# Patient Record
Sex: MALE | Race: BLACK OR AFRICAN AMERICAN | Employment: OTHER | URBAN - METROPOLITAN AREA
[De-identification: names, ages, dates, MRNs, and addresses within clinical notes are randomized per-mention and may not be internally consistent; named-entity substitution may affect disease eponyms.]

---

## 2017-03-31 ENCOUNTER — HOSPITAL ENCOUNTER (EMERGENCY)
Facility: HOSPITAL | Age: 42
Discharge: HOME | End: 2017-03-31
Payer: MEDICAID

## 2017-03-31 VITALS
SYSTOLIC BLOOD PRESSURE: 137 MMHG | DIASTOLIC BLOOD PRESSURE: 89 MMHG | HEART RATE: 68 BPM | BODY MASS INDEX: 31.83 KG/M2 | RESPIRATION RATE: 16 BRPM | WEIGHT: 210 LBS | HEIGHT: 68 IN | OXYGEN SATURATION: 100 % | TEMPERATURE: 98 F

## 2017-04-01 NOTE — ED INITIAL ASSESSMENT (HPI)
PT states he has yellow drainage from his belly button and redness for 4 days. PT states he noticed this after cleaning his belly button with a q tip.

## 2017-05-25 ENCOUNTER — HOSPITAL ENCOUNTER (EMERGENCY)
Facility: HOSPITAL | Age: 42
Discharge: HOME | End: 2017-05-25
Attending: EMERGENCY MEDICINE
Payer: MEDICAID

## 2017-05-25 VITALS
WEIGHT: 209 LBS | OXYGEN SATURATION: 97 % | DIASTOLIC BLOOD PRESSURE: 96 MMHG | HEIGHT: 67 IN | BODY MASS INDEX: 32.8 KG/M2 | RESPIRATION RATE: 16 BRPM | SYSTOLIC BLOOD PRESSURE: 139 MMHG | HEART RATE: 92 BPM | TEMPERATURE: 98 F

## 2017-05-25 DIAGNOSIS — L23.4 ALLERGIC CONTACT DERMATITIS DUE TO DYES: Primary | ICD-10-CM

## 2017-05-25 PROCEDURE — 99283 EMERGENCY DEPT VISIT LOW MDM: CPT

## 2017-05-25 RX ORDER — METHYLPREDNISOLONE 4 MG/1
TABLET ORAL
Qty: 1 PACKAGE | Refills: 0 | Status: SHIPPED | OUTPATIENT
Start: 2017-05-25 | End: 2017-05-30

## 2017-05-25 NOTE — ED INITIAL ASSESSMENT (HPI)
Pt c/o blisters, swelling and itching to face after applying hair dye, pt denies PANCHO or airway compromise.

## 2017-05-25 NOTE — ED PROVIDER NOTES
Patient Seen in: [facility] Emergency Department    History   Patient presents with:  Rash Skin Problem (integumentary)    Stated Complaint: rash from hair dye     HPI  Patient is a 38-year-old male who states one time previously he used hair dye wh 170.2 cm (5' 7\")  Wt 94.802 kg  BMI 32.73 kg/m2  SpO2 97%        Physical Exam  GENERAL: Patient resting comfortably on the cart in no acute distress. HEENT: Extraocular muscles intact, pupils equal round reactive to light and accommodation.   Mouth lucas

## 2017-10-06 ENCOUNTER — HOSPITAL ENCOUNTER (EMERGENCY)
Age: 42
Discharge: HOME | End: 2017-10-06
Attending: EMERGENCY MEDICINE
Payer: MEDICAID

## 2017-10-06 VITALS
OXYGEN SATURATION: 99 % | SYSTOLIC BLOOD PRESSURE: 145 MMHG | DIASTOLIC BLOOD PRESSURE: 88 MMHG | TEMPERATURE: 99 F | HEART RATE: 69 BPM | RESPIRATION RATE: 18 BRPM | HEIGHT: 70 IN | WEIGHT: 189 LBS | BODY MASS INDEX: 27.06 KG/M2

## 2017-10-06 DIAGNOSIS — T78.40XA ALLERGIC REACTION, INITIAL ENCOUNTER: Primary | ICD-10-CM

## 2017-10-06 PROCEDURE — 99283 EMERGENCY DEPT VISIT LOW MDM: CPT

## 2017-10-06 PROCEDURE — 96372 THER/PROPH/DIAG INJ SC/IM: CPT

## 2017-10-06 RX ORDER — METHYLPREDNISOLONE 4 MG/1
TABLET ORAL
Qty: 1 PACKAGE | Refills: 0 | Status: SHIPPED | OUTPATIENT
Start: 2017-10-06 | End: 2017-10-11

## 2017-10-06 RX ORDER — DIPHENHYDRAMINE HCL 50 MG
50 CAPSULE ORAL ONCE
Status: COMPLETED | OUTPATIENT
Start: 2017-10-06 | End: 2017-10-06

## 2017-10-06 RX ORDER — FAMOTIDINE 20 MG/1
20 TABLET ORAL ONCE
Status: COMPLETED | OUTPATIENT
Start: 2017-10-06 | End: 2017-10-06

## 2017-10-06 RX ORDER — METHYLPREDNISOLONE SODIUM SUCCINATE 125 MG/2ML
125 INJECTION, POWDER, LYOPHILIZED, FOR SOLUTION INTRAMUSCULAR; INTRAVENOUS ONCE
Status: COMPLETED | OUTPATIENT
Start: 2017-10-06 | End: 2017-10-06

## 2017-10-06 NOTE — ED INITIAL ASSESSMENT (HPI)
Used dye on facial hair and head yesterday, now with itching, tight, oozing, denies difficulty breathing

## 2017-10-06 NOTE — ED PROVIDER NOTES
Patient Seen in: THE [facility] Emergency Department In [place]    History   Patient presents with:   Allergic Rxn Allergies (immune)    Stated Complaint: facial/head swelling, used dye for facial hairs, swollen, itching, started yest*    HPI    Patient used a some edema of the face. There is also edema and erythema around the area of his beard with some tiny blistering and crusting noted.   Eyes: sclera white, conjunctiva pink and moist.  Lids and lashes are normal.  Nose: Unremarkable without purulent nasal se

## 2018-06-24 ENCOUNTER — APPOINTMENT (OUTPATIENT)
Dept: GENERAL RADIOLOGY | Age: 43
End: 2018-06-24
Payer: MEDICAID

## 2018-06-24 ENCOUNTER — HOSPITAL ENCOUNTER (EMERGENCY)
Age: 43
Discharge: HOME | End: 2018-06-24
Payer: MEDICAID

## 2018-06-24 VITALS
TEMPERATURE: 99 F | SYSTOLIC BLOOD PRESSURE: 141 MMHG | BODY MASS INDEX: 28.28 KG/M2 | OXYGEN SATURATION: 96 % | DIASTOLIC BLOOD PRESSURE: 99 MMHG | RESPIRATION RATE: 18 BRPM | WEIGHT: 202 LBS | HEIGHT: 71 IN | HEART RATE: 83 BPM

## 2018-06-24 DIAGNOSIS — J40 BRONCHITIS: ICD-10-CM

## 2018-06-24 DIAGNOSIS — R11.0 NAUSEA: ICD-10-CM

## 2018-06-24 DIAGNOSIS — J04.0 ACUTE LARYNGITIS: Primary | ICD-10-CM

## 2018-06-24 PROCEDURE — 99284 EMERGENCY DEPT VISIT MOD MDM: CPT

## 2018-06-24 PROCEDURE — 94640 AIRWAY INHALATION TREATMENT: CPT

## 2018-06-24 PROCEDURE — 71046 X-RAY EXAM CHEST 2 VIEWS: CPT

## 2018-06-24 PROCEDURE — 94664 DEMO&/EVAL PT USE INHALER: CPT

## 2018-06-24 RX ORDER — METHYLPREDNISOLONE 4 MG/1
TABLET ORAL
Qty: 1 PACKAGE | Refills: 0 | Status: SHIPPED | OUTPATIENT
Start: 2018-06-24 | End: 2018-11-11

## 2018-06-24 RX ORDER — IPRATROPIUM BROMIDE AND ALBUTEROL SULFATE 2.5; .5 MG/3ML; MG/3ML
3 SOLUTION RESPIRATORY (INHALATION) ONCE
Status: COMPLETED | OUTPATIENT
Start: 2018-06-24 | End: 2018-06-24

## 2018-06-24 RX ORDER — ONDANSETRON 4 MG/1
4 TABLET, ORALLY DISINTEGRATING ORAL EVERY 8 HOURS PRN
Qty: 10 TABLET | Refills: 0 | Status: SHIPPED | OUTPATIENT
Start: 2018-06-24 | End: 2018-07-01

## 2018-06-24 RX ORDER — ALBUTEROL SULFATE 90 UG/1
2 AEROSOL, METERED RESPIRATORY (INHALATION) EVERY 4 HOURS PRN
Qty: 1 INHALER | Refills: 0 | Status: SHIPPED | OUTPATIENT
Start: 2018-06-24 | End: 2018-07-24

## 2018-06-24 NOTE — ED INITIAL ASSESSMENT (HPI)
Pt c/o productive cough with yellow phlegm and sneezing. Denies fever. Pt states with hoarse voice, \"I've lost my voice too\".

## 2018-06-24 NOTE — ED PROVIDER NOTES
Patient Seen in: THE [facility] [place] [place] Emergency Department In [place]    History   Patient presents with:  Cough/URI    Stated Complaint: COUGH/SORE THROAT/ YELLOW PHELGM    HPI    CHIEF COMPLAINT: Cough, chest tightness, hoarse voice     HISTORY OF PRESENT ILLNESS The patient's medication list, past medical history and social history elements is as listed in today's nurse's notes are reviewed and agree.  The patient's family history is reviewed and is noncontributory to the presenting problem, except as indicated a Gait normal.  Skin:  warm and dry, no rashes. No jaundice. Brisk capillary refill  Musculoskeletal: neck is supple, no lymphadenopathy, non tender. no meningeal signs.   Extremities are symmetrical, full range of motion  Psychiatric: calm and cooperative days for reevaluation. If there are any new, changing or worsening symptoms he can return. Patient and his significant other voiced understanding to the treatment plan. All questions answered.           Disposition and Plan     Clinical Impression:  Chirag

## 2018-07-07 ENCOUNTER — HOSPITAL ENCOUNTER (EMERGENCY)
Facility: HOSPITAL | Age: 43
Discharge: HOME | End: 2018-07-07
Payer: MEDICAID

## 2018-07-07 VITALS
WEIGHT: 201 LBS | TEMPERATURE: 97 F | SYSTOLIC BLOOD PRESSURE: 138 MMHG | HEIGHT: 71 IN | RESPIRATION RATE: 20 BRPM | DIASTOLIC BLOOD PRESSURE: 94 MMHG | OXYGEN SATURATION: 96 % | BODY MASS INDEX: 28.14 KG/M2 | HEART RATE: 77 BPM

## 2018-07-07 DIAGNOSIS — J45.41 MODERATE PERSISTENT ASTHMA WITH EXACERBATION: ICD-10-CM

## 2018-07-07 DIAGNOSIS — J40 BRONCHITIS: Primary | ICD-10-CM

## 2018-07-07 PROCEDURE — 99283 EMERGENCY DEPT VISIT LOW MDM: CPT

## 2018-07-07 RX ORDER — DOXYCYCLINE HYCLATE 100 MG/1
100 CAPSULE ORAL ONCE
Status: COMPLETED | OUTPATIENT
Start: 2018-07-07 | End: 2018-07-07

## 2018-07-07 RX ORDER — DOXYCYCLINE 100 MG/1
100 CAPSULE ORAL 2 TIMES DAILY
Qty: 20 CAPSULE | Refills: 0 | Status: SHIPPED | OUTPATIENT
Start: 2018-07-07 | End: 2018-07-17

## 2018-07-07 RX ORDER — PREDNISONE 20 MG/1
60 TABLET ORAL ONCE
Status: COMPLETED | OUTPATIENT
Start: 2018-07-07 | End: 2018-07-07

## 2018-07-07 RX ORDER — PREDNISONE 20 MG/1
60 TABLET ORAL DAILY
Qty: 12 TABLET | Refills: 0 | Status: SHIPPED | OUTPATIENT
Start: 2018-07-07 | End: 2018-07-11

## 2018-07-07 NOTE — ED INITIAL ASSESSMENT (HPI)
Pt here with 2 weeks of cough and congestion. Pt was seen at an urgent care 1 week ago and given steroids and an inhaler. Pt states he had a fever this morning and his sputum turned from yellow to green.

## 2018-07-09 NOTE — ED PROVIDER NOTES
Patient Seen in: [facility] Emergency Department    History   Patient presents with:  Cough/URI  Fever (infectious)    Stated Complaint: cough, fever    HPI    Patient presents with cough.   Started a few weeks ago and he is a non-smoker, nonasthmati clear breath sounds without wheezes  Minimal dry cough  Heart: Regular rate and rhythm     Abdomen: Soft, nondistended,  No tenderness     Back: No costovertebral angle tenderness.      Extremities: Warm, well perfused, without edema    No significant defor Oral Cap  Take 1 capsule (100 mg total) by mouth 2 (two) times daily. , Normal, Disp-20 capsule, R-0

## 2018-11-11 ENCOUNTER — APPOINTMENT (OUTPATIENT)
Dept: ULTRASOUND IMAGING | Age: 43
End: 2018-11-11
Attending: PHYSICIAN ASSISTANT
Payer: MEDICAID

## 2018-11-11 ENCOUNTER — HOSPITAL ENCOUNTER (EMERGENCY)
Age: 43
Discharge: HOME | End: 2018-11-11
Attending: EMERGENCY MEDICINE
Payer: MEDICAID

## 2018-11-11 VITALS
SYSTOLIC BLOOD PRESSURE: 138 MMHG | DIASTOLIC BLOOD PRESSURE: 90 MMHG | HEIGHT: 71 IN | TEMPERATURE: 97 F | WEIGHT: 206 LBS | RESPIRATION RATE: 14 BRPM | HEART RATE: 60 BPM | BODY MASS INDEX: 28.84 KG/M2 | OXYGEN SATURATION: 98 %

## 2018-11-11 DIAGNOSIS — N45.1 EPIDIDYMITIS: Primary | ICD-10-CM

## 2018-11-11 DIAGNOSIS — I86.1 VARICOCELE: ICD-10-CM

## 2018-11-11 PROCEDURE — 87591 N.GONORRHOEAE DNA AMP PROB: CPT | Performed by: PHYSICIAN ASSISTANT

## 2018-11-11 PROCEDURE — 99284 EMERGENCY DEPT VISIT MOD MDM: CPT

## 2018-11-11 PROCEDURE — 87491 CHLMYD TRACH DNA AMP PROBE: CPT | Performed by: PHYSICIAN ASSISTANT

## 2018-11-11 PROCEDURE — 76870 US EXAM SCROTUM: CPT | Performed by: PHYSICIAN ASSISTANT

## 2018-11-11 PROCEDURE — 81003 URINALYSIS AUTO W/O SCOPE: CPT | Performed by: PHYSICIAN ASSISTANT

## 2018-11-11 PROCEDURE — 93975 VASCULAR STUDY: CPT | Performed by: PHYSICIAN ASSISTANT

## 2018-11-11 RX ORDER — SERTRALINE HYDROCHLORIDE 20 MG/ML
25 SOLUTION ORAL DAILY
COMMUNITY
End: 2019-09-26

## 2018-11-11 RX ORDER — CIPROFLOXACIN 500 MG/1
500 TABLET, FILM COATED ORAL 2 TIMES DAILY
Qty: 14 TABLET | Refills: 0 | Status: SHIPPED | OUTPATIENT
Start: 2018-11-11 | End: 2018-11-18

## 2018-11-11 NOTE — ED PROVIDER NOTES
Patient Seen in: [facility] Emergency Department In [place]    History   Patient presents with:  Eval-G (gynecologic)    Stated Complaint: eval g. ..testicular pain    HPI    61-year-old -American male with a history of anxiety presents to the ER to 28.73 kg/m²            Physical Exam   Constitutional: He appears well-developed and well-nourished. No distress. Abdominal: Soft. Bowel sounds are normal. There is no tenderness.  Hernia confirmed negative in the right inguinal area and confirmed negativ Varicocele present bilaterally with reflux. Hydrocele: none. Scrotal skin: within normal limits. CONCLUSION:  Bilateral varicocele. No hydrocele, testicular mass, solid epididymal mass. There is a small cyst 6 mm in the right epididymis.   No sign

## 2018-11-11 NOTE — ED INITIAL ASSESSMENT (HPI)
C/o discharge from the penis every time after bowel movement x 2 weeks. On and off Testicular pain x 2-3 days.

## 2018-11-11 NOTE — ED PROVIDER NOTES
Patient reports that on 2 occasions last week while having a bowel movement, he noted discharge from the penis. Discharge was cloudy and whitish. Patient did not have any burning with urination, urgency, or frequency.   His bowel movement was normal.  No

## 2019-01-24 ENCOUNTER — HOSPITAL ENCOUNTER (EMERGENCY)
Facility: HOSPITAL | Age: 44
Discharge: HOME | End: 2019-01-24

## 2019-01-24 VITALS
BODY MASS INDEX: 29.12 KG/M2 | TEMPERATURE: 99 F | HEIGHT: 71 IN | HEART RATE: 79 BPM | OXYGEN SATURATION: 100 % | DIASTOLIC BLOOD PRESSURE: 96 MMHG | WEIGHT: 208 LBS | SYSTOLIC BLOOD PRESSURE: 151 MMHG | RESPIRATION RATE: 19 BRPM

## 2019-01-24 DIAGNOSIS — J01.00 ACUTE MAXILLARY SINUSITIS, RECURRENCE NOT SPECIFIED: Primary | ICD-10-CM

## 2019-01-24 PROCEDURE — 99283 EMERGENCY DEPT VISIT LOW MDM: CPT | Performed by: EMERGENCY MEDICINE

## 2019-01-24 RX ORDER — AMOXICILLIN 875 MG/1
875 TABLET, COATED ORAL 2 TIMES DAILY
Qty: 20 TABLET | Refills: 0 | Status: SHIPPED | OUTPATIENT
Start: 2019-01-24 | End: 2019-02-03

## 2019-01-24 NOTE — ED INITIAL ASSESSMENT (HPI)
Per patient, feeling sinus congestion for 7 days with worsening of symptoms 3 days ago. Left ear pressure as well. No fever.  AAO X 4

## 2019-01-24 NOTE — ED PROVIDER NOTES
Patient Seen in: [facility] Emergency Department    History   Patient presents with:  Cough/URI    Stated Complaint: uri    HPI    Patient is a pleasant 53-year-old male, presenting for evaluation of suspected sinusitis.     Patient reports upper res supple and nontender. The trachea is midline. LUNGS: Lungs are clear to auscultation bilaterally, respirations are unlabored. HEART: Regular rate and rhythm. There are no rubs or gallops. ABDOMEN: The abdomen is soft, nondistended, nontender.  Bowel s

## 2019-01-29 ENCOUNTER — HOSPITAL ENCOUNTER (EMERGENCY)
Facility: HOSPITAL | Age: 44
Discharge: HOME | End: 2019-01-29
Attending: EMERGENCY MEDICINE

## 2019-01-29 ENCOUNTER — APPOINTMENT (OUTPATIENT)
Dept: CT IMAGING | Facility: HOSPITAL | Age: 44
End: 2019-01-29
Attending: EMERGENCY MEDICINE

## 2019-01-29 VITALS
OXYGEN SATURATION: 98 % | RESPIRATION RATE: 18 BRPM | WEIGHT: 209 LBS | HEIGHT: 71 IN | BODY MASS INDEX: 29.26 KG/M2 | SYSTOLIC BLOOD PRESSURE: 140 MMHG | DIASTOLIC BLOOD PRESSURE: 103 MMHG | TEMPERATURE: 98 F | HEART RATE: 84 BPM

## 2019-01-29 DIAGNOSIS — I15.9 SECONDARY HYPERTENSION: ICD-10-CM

## 2019-01-29 DIAGNOSIS — J01.90 ACUTE SINUSITIS, RECURRENCE NOT SPECIFIED, UNSPECIFIED LOCATION: Primary | ICD-10-CM

## 2019-01-29 DIAGNOSIS — R51.9 HEADACHE DISORDER: ICD-10-CM

## 2019-01-29 LAB
ALBUMIN SERPL-MCNC: 3.9 G/DL (ref 3.1–4.5)
ALBUMIN/GLOB SERPL: 1.1 {RATIO} (ref 1–2)
ALP LIVER SERPL-CCNC: 71 U/L (ref 45–117)
ALT SERPL-CCNC: 64 U/L (ref 17–63)
ANION GAP SERPL CALC-SCNC: 6 MMOL/L (ref 0–18)
AST SERPL-CCNC: 38 U/L (ref 15–41)
BASOPHILS # BLD AUTO: 0.02 X10(3) UL (ref 0–0.2)
BASOPHILS NFR BLD AUTO: 0.3 %
BILIRUB SERPL-MCNC: 0.4 MG/DL (ref 0.1–2)
BUN BLD-MCNC: 16 MG/DL (ref 8–20)
BUN/CREAT SERPL: 13 (ref 10–20)
CALCIUM BLD-MCNC: 9.1 MG/DL (ref 8.3–10.3)
CHLORIDE SERPL-SCNC: 109 MMOL/L (ref 101–111)
CO2 SERPL-SCNC: 26 MMOL/L (ref 22–32)
CREAT BLD-MCNC: 1.23 MG/DL (ref 0.7–1.3)
DEPRECATED RDW RBC AUTO: 46.1 FL (ref 35.1–46.3)
EOSINOPHIL # BLD AUTO: 0.08 X10(3) UL (ref 0–0.7)
EOSINOPHIL NFR BLD AUTO: 1.1 %
ERYTHROCYTE [DISTWIDTH] IN BLOOD BY AUTOMATED COUNT: 13.7 % (ref 11–15)
GLOBULIN PLAS-MCNC: 3.5 G/DL (ref 2.8–4.4)
GLUCOSE BLD-MCNC: 88 MG/DL (ref 70–99)
HCT VFR BLD AUTO: 45.3 % (ref 39–53)
HGB BLD-MCNC: 15 G/DL (ref 13–17.5)
IMM GRANULOCYTES # BLD AUTO: 0.02 X10(3) UL (ref 0–1)
IMM GRANULOCYTES NFR BLD: 0.3 %
LYMPHOCYTES # BLD AUTO: 4.35 X10(3) UL (ref 1–4)
LYMPHOCYTES NFR BLD AUTO: 58.4 %
M PROTEIN MFR SERPL ELPH: 7.4 G/DL (ref 6.4–8.2)
MCH RBC QN AUTO: 29.9 PG (ref 26–34)
MCHC RBC AUTO-ENTMCNC: 33.1 G/DL (ref 31–37)
MCV RBC AUTO: 90.4 FL (ref 80–100)
MONOCYTES # BLD AUTO: 0.58 X10(3) UL (ref 0.1–1)
MONOCYTES NFR BLD AUTO: 7.8 %
NEUTROPHILS # BLD AUTO: 2.4 X10 (3) UL (ref 1.5–7.7)
NEUTROPHILS # BLD AUTO: 2.4 X10(3) UL (ref 1.5–7.7)
NEUTROPHILS NFR BLD AUTO: 32.1 %
OSMOLALITY SERPL CALC.SUM OF ELEC: 293 MOSM/KG (ref 275–295)
PLATELET # BLD AUTO: 310 10(3)UL (ref 150–450)
POTASSIUM SERPL-SCNC: 4.3 MMOL/L (ref 3.6–5.1)
RBC # BLD AUTO: 5.01 X10(6)UL (ref 4.3–5.7)
SODIUM SERPL-SCNC: 141 MMOL/L (ref 136–144)
WBC # BLD AUTO: 7.5 X10(3) UL (ref 4–11)

## 2019-01-29 PROCEDURE — 96366 THER/PROPH/DIAG IV INF ADDON: CPT

## 2019-01-29 PROCEDURE — 80053 COMPREHEN METABOLIC PANEL: CPT | Performed by: EMERGENCY MEDICINE

## 2019-01-29 PROCEDURE — 96375 TX/PRO/DX INJ NEW DRUG ADDON: CPT

## 2019-01-29 PROCEDURE — 99284 EMERGENCY DEPT VISIT MOD MDM: CPT

## 2019-01-29 PROCEDURE — 70450 CT HEAD/BRAIN W/O DYE: CPT | Performed by: EMERGENCY MEDICINE

## 2019-01-29 PROCEDURE — 96365 THER/PROPH/DIAG IV INF INIT: CPT

## 2019-01-29 PROCEDURE — 85025 COMPLETE CBC W/AUTO DIFF WBC: CPT | Performed by: EMERGENCY MEDICINE

## 2019-01-29 RX ORDER — MORPHINE SULFATE 4 MG/ML
4 INJECTION, SOLUTION INTRAMUSCULAR; INTRAVENOUS EVERY 30 MIN PRN
Status: DISCONTINUED | OUTPATIENT
Start: 2019-01-29 | End: 2019-01-30

## 2019-01-29 RX ORDER — TRAMADOL HYDROCHLORIDE 50 MG/1
50 TABLET ORAL EVERY 4 HOURS PRN
Qty: 10 TABLET | Refills: 0 | Status: SHIPPED | OUTPATIENT
Start: 2019-01-29 | End: 2019-02-05

## 2019-01-29 RX ORDER — AMLODIPINE BESYLATE 5 MG/1
5 TABLET ORAL DAILY
Qty: 30 TABLET | Refills: 0 | Status: SHIPPED | OUTPATIENT
Start: 2019-01-29 | End: 2019-02-28

## 2019-01-29 RX ORDER — CEFDINIR 300 MG/1
300 CAPSULE ORAL 2 TIMES DAILY
Qty: 20 CAPSULE | Refills: 0 | Status: SHIPPED | OUTPATIENT
Start: 2019-01-29 | End: 2019-02-08

## 2019-01-29 RX ORDER — ONDANSETRON 2 MG/ML
4 INJECTION INTRAMUSCULAR; INTRAVENOUS ONCE
Status: COMPLETED | OUTPATIENT
Start: 2019-01-29 | End: 2019-01-29

## 2019-01-29 RX ORDER — ONDANSETRON 4 MG/1
4 TABLET, ORALLY DISINTEGRATING ORAL EVERY 4 HOURS PRN
Qty: 20 TABLET | Refills: 0 | Status: SHIPPED | OUTPATIENT
Start: 2019-01-29 | End: 2019-03-26 | Stop reason: ALTCHOICE

## 2019-01-30 NOTE — ED INITIAL ASSESSMENT (HPI)
PT WAS SEEN ON Thursday FOR R/O DENTAL PAIN VS SINUS INFECTION. PT WAS GIVEN ANTIBX FOR SINUS INFECTION BUT PT STATES HE IS NOT GETTING BETTER. PT WITH NASAL/L CHEEK PAIN UP TO THE HEAD. PAIN IS WORSE WHEN LAYING DOWN.

## 2019-01-30 NOTE — ED PROVIDER NOTES
Patient Seen in: [facility] Emergency Department    History   Patient presents with:  Cough/URI    Stated Complaint: CONGESTION    HPI    This is a 56-year-old male who arrives here with sinus pain she describes the pain in his sinuses, going to his Cardiovascular is regular without murmurs or rubs. ABD: The abdomen is soft nondistended nontender. There is no rebound. There is no guarding. EXT: There is good pulses bilaterally. There is no calf tenderness. There is no rash noted.   There is n PATIENT STATED HISTORY: (As transcribed by Technologist)  c/o pain to left side of head   being treated x 1 week for sinus infection   not feeling any better    FINDINGS:  VENTRICLES/SULCI:  Small arachnoid cyst right posterior fossa measuring 2.6 x 1.5 x Prescribed:  Current Discharge Medication List    START taking these medications    cefdinir 300 MG Oral Cap  Take 1 capsule (300 mg total) by mouth 2 (two) times daily for 10 days.   Qty: 20 capsule Refills: 0    ondansetron 4 MG Oral Tablet Dispersible  T

## 2019-02-01 ENCOUNTER — HOSPITAL ENCOUNTER (EMERGENCY)
Facility: HOSPITAL | Age: 44
Discharge: HOME | End: 2019-02-01
Attending: EMERGENCY MEDICINE

## 2019-02-01 VITALS
OXYGEN SATURATION: 99 % | TEMPERATURE: 99 F | RESPIRATION RATE: 18 BRPM | SYSTOLIC BLOOD PRESSURE: 136 MMHG | DIASTOLIC BLOOD PRESSURE: 105 MMHG | HEART RATE: 66 BPM | HEIGHT: 71 IN | WEIGHT: 209 LBS | BODY MASS INDEX: 29.26 KG/M2

## 2019-02-01 DIAGNOSIS — R51.9 HEADACHE BEHIND THE EYES: Primary | ICD-10-CM

## 2019-02-01 PROCEDURE — 99285 EMERGENCY DEPT VISIT HI MDM: CPT

## 2019-02-01 PROCEDURE — 99284 EMERGENCY DEPT VISIT MOD MDM: CPT

## 2019-02-01 PROCEDURE — 96361 HYDRATE IV INFUSION ADD-ON: CPT

## 2019-02-01 PROCEDURE — 96374 THER/PROPH/DIAG INJ IV PUSH: CPT

## 2019-02-01 PROCEDURE — 96375 TX/PRO/DX INJ NEW DRUG ADDON: CPT

## 2019-02-01 RX ORDER — KETOROLAC TROMETHAMINE 30 MG/ML
15 INJECTION, SOLUTION INTRAMUSCULAR; INTRAVENOUS ONCE
Status: COMPLETED | OUTPATIENT
Start: 2019-02-01 | End: 2019-02-01

## 2019-02-01 RX ORDER — METOCLOPRAMIDE HYDROCHLORIDE 5 MG/ML
5 INJECTION INTRAMUSCULAR; INTRAVENOUS ONCE
Status: COMPLETED | OUTPATIENT
Start: 2019-02-01 | End: 2019-02-01

## 2019-02-01 RX ORDER — HALOPERIDOL 5 MG/ML
5 INJECTION INTRAMUSCULAR ONCE
Status: COMPLETED | OUTPATIENT
Start: 2019-02-01 | End: 2019-02-01

## 2019-02-01 RX ORDER — DEXAMETHASONE SODIUM PHOSPHATE 4 MG/ML
10 VIAL (ML) INJECTION ONCE
Status: COMPLETED | OUTPATIENT
Start: 2019-02-01 | End: 2019-02-01

## 2019-02-01 RX ORDER — DIPHENHYDRAMINE HYDROCHLORIDE 50 MG/ML
25 INJECTION INTRAMUSCULAR; INTRAVENOUS ONCE
Status: COMPLETED | OUTPATIENT
Start: 2019-02-01 | End: 2019-02-01

## 2019-02-01 RX ORDER — METHYLPREDNISOLONE 4 MG/1
TABLET ORAL
Qty: 1 PACKAGE | Refills: 0 | Status: SHIPPED | OUTPATIENT
Start: 2019-02-01 | End: 2019-02-06

## 2019-02-01 NOTE — ED PROVIDER NOTES
Patient Seen in: [facility] Emergency Department    History   Patient presents with:  Nausea/Vomiting/Diarrhea (gastrointestinal)  Sore Throat    Stated Complaint: headache/pressure, nausea/vomiting, sore throat. seen here twice before.  sympto*    H pain  Skin: no rashes  Otherwise a complete review of systems was obtained and other than the HPI was negative     The patient's medication list, past medical history and social history elements is as listed in today's nurse's notes are reviewed and agree. retractions, lungs are clear to auscultation  Cardiovascular: regular rate and rhythm, normal S1, S2  Gastrointestinal:  abdomen is soft and non tender, no masses, bowel sounds normal.  No rebound, guarding or rigidity noted. No abdominal distention.   Jeff relapse or worsening. I discussed the case with the patient and they had no questions, complaints, or concerns. Patient states they understand diagnosis, followup plan and agree with and understand  discharge instructions and plan.  I answered all of the

## 2019-02-01 NOTE — ED NOTES
Patient reports left sided head and cheek pain, reports that he also has pressure in the back of his head. Which is new since Tuesday. Reports he has been nauseas and threw up this morning and feels like he has a sore throat.  Reports that he was on Amoxici

## 2019-02-01 NOTE — ED INITIAL ASSESSMENT (HPI)
Pt reports sinus pressure, headache, n/v. 3rd visit, 3rd day in a row. Reports medications are not working.

## 2019-02-02 NOTE — ED NOTES
Patient reports that the Haldol helped take the \"edge\" off his pain. Patient now reports his headache a rating of 2/10.

## 2019-03-26 ENCOUNTER — APPOINTMENT (OUTPATIENT)
Dept: GENERAL RADIOLOGY | Facility: HOSPITAL | Age: 44
End: 2019-03-26
Payer: MEDICAID

## 2019-03-26 ENCOUNTER — HOSPITAL ENCOUNTER (EMERGENCY)
Facility: HOSPITAL | Age: 44
Discharge: HOME | End: 2019-03-26
Attending: EMERGENCY MEDICINE
Payer: MEDICAID

## 2019-03-26 VITALS
RESPIRATION RATE: 14 BRPM | SYSTOLIC BLOOD PRESSURE: 148 MMHG | HEIGHT: 70 IN | HEART RATE: 69 BPM | WEIGHT: 206 LBS | BODY MASS INDEX: 29.49 KG/M2 | TEMPERATURE: 98 F | DIASTOLIC BLOOD PRESSURE: 104 MMHG | OXYGEN SATURATION: 100 %

## 2019-03-26 DIAGNOSIS — J01.90 ACUTE SINUSITIS, RECURRENCE NOT SPECIFIED, UNSPECIFIED LOCATION: Primary | ICD-10-CM

## 2019-03-26 DIAGNOSIS — J40 BRONCHITIS: ICD-10-CM

## 2019-03-26 PROCEDURE — 99283 EMERGENCY DEPT VISIT LOW MDM: CPT | Performed by: EMERGENCY MEDICINE

## 2019-03-26 PROCEDURE — 71046 X-RAY EXAM CHEST 2 VIEWS: CPT | Performed by: EMERGENCY MEDICINE

## 2019-03-26 RX ORDER — PREDNISONE 20 MG/1
20 TABLET ORAL DAILY
Qty: 5 TABLET | Refills: 0 | Status: SHIPPED | OUTPATIENT
Start: 2019-03-26 | End: 2019-03-31

## 2019-03-26 RX ORDER — AMOXICILLIN AND CLAVULANATE POTASSIUM 875; 125 MG/1; MG/1
1 TABLET, FILM COATED ORAL 2 TIMES DAILY
Qty: 20 TABLET | Refills: 0 | Status: SHIPPED | OUTPATIENT
Start: 2019-03-26 | End: 2019-04-05

## 2019-03-26 NOTE — ED INITIAL ASSESSMENT (HPI)
Pt here for cold symptoms  Per pt, \"I've been coughing for the last 10 days. The last 3 days the phlegm has been green colored. I started with sore throat about 5 days and I have intermittent ear pain. Everything is worse at night. I barely sleep. \"  Neal Morgan

## 2019-03-26 NOTE — ED PROVIDER NOTES
Patient Seen in: [facility] Emergency Department    History   Patient presents with:  Ear Problem Pain (neurosensory)  Sore Throat  Cough/URI    Stated Complaint: ear pain, sore throat, coughing green phlegm    HPI    40-year-old male presents to th and rhinorrhea present. Mouth/Throat: Oropharynx is clear and moist.   Eyes: EOM are normal. Pupils are equal, round, and reactive to light. Neck: Normal range of motion. Neck supple.    Cardiovascular: Normal rate, regular rhythm, normal heart sounds a Script, Disp-20 tablet, R-0    predniSONE 20 MG Oral Tab  Take 1 tablet (20 mg total) by mouth daily for 5 days. , Print Script, Disp-5 tablet, R-0

## 2019-05-08 ENCOUNTER — HOSPITAL ENCOUNTER (EMERGENCY)
Facility: HOSPITAL | Age: 44
Discharge: HOME | End: 2019-05-08
Attending: EMERGENCY MEDICINE
Payer: MEDICAID

## 2019-05-08 VITALS
RESPIRATION RATE: 17 BRPM | HEIGHT: 70 IN | HEART RATE: 57 BPM | BODY MASS INDEX: 27.06 KG/M2 | TEMPERATURE: 98 F | WEIGHT: 189 LBS | DIASTOLIC BLOOD PRESSURE: 110 MMHG | SYSTOLIC BLOOD PRESSURE: 151 MMHG | OXYGEN SATURATION: 99 %

## 2019-05-08 DIAGNOSIS — R30.0 DYSURIA: Primary | ICD-10-CM

## 2019-05-08 DIAGNOSIS — I10 ESSENTIAL HYPERTENSION: ICD-10-CM

## 2019-05-08 PROCEDURE — 87591 N.GONORRHOEAE DNA AMP PROB: CPT | Performed by: EMERGENCY MEDICINE

## 2019-05-08 PROCEDURE — 81001 URINALYSIS AUTO W/SCOPE: CPT | Performed by: EMERGENCY MEDICINE

## 2019-05-08 PROCEDURE — 96372 THER/PROPH/DIAG INJ SC/IM: CPT

## 2019-05-08 PROCEDURE — 99284 EMERGENCY DEPT VISIT MOD MDM: CPT

## 2019-05-08 PROCEDURE — 87491 CHLMYD TRACH DNA AMP PROBE: CPT | Performed by: EMERGENCY MEDICINE

## 2019-05-08 PROCEDURE — 99283 EMERGENCY DEPT VISIT LOW MDM: CPT

## 2019-05-08 RX ORDER — AZITHROMYCIN 250 MG/1
1000 TABLET, FILM COATED ORAL ONCE
Status: COMPLETED | OUTPATIENT
Start: 2019-05-08 | End: 2019-05-08

## 2019-05-08 NOTE — ED INITIAL ASSESSMENT (HPI)
Patient to ED c/o \"not feeling right\" when urinating. Denies difficulty urinating, penile discharge, abdominal or flank pain and fevers.

## 2019-05-08 NOTE — ED PROVIDER NOTES
Patient Seen in: [facility] Emergency Department    History   No chief complaint on file. Stated Complaint: URINARY SYMPTOMS    HPI    The patient is a 38-year-old male presents difficulty urinating symptoms over the last 2 days.   Says it just do Urine Small (*)     All other components within normal limits   CHLAMYDIA/GONOCOCCUS, ASHLEE          Change in urination. Unclear etiology. GC chlamydia sent. Will check UA. Will send urine for culture. UA essentially negative.   1-4 white blood cells

## 2019-09-26 ENCOUNTER — OFFICE VISIT (OUTPATIENT)
Dept: FAMILY MEDICINE CLINIC | Facility: CLINIC | Age: 44
End: 2019-09-26
Payer: MEDICAID

## 2019-09-26 VITALS
DIASTOLIC BLOOD PRESSURE: 80 MMHG | HEART RATE: 69 BPM | WEIGHT: 224.5 LBS | HEIGHT: 71 IN | BODY MASS INDEX: 31.43 KG/M2 | RESPIRATION RATE: 16 BRPM | TEMPERATURE: 89 F | OXYGEN SATURATION: 99 % | SYSTOLIC BLOOD PRESSURE: 140 MMHG

## 2019-09-26 DIAGNOSIS — F41.9 ANXIETY: ICD-10-CM

## 2019-09-26 DIAGNOSIS — Z23 NEED FOR VACCINATION: ICD-10-CM

## 2019-09-26 DIAGNOSIS — F32.A DEPRESSION, UNSPECIFIED DEPRESSION TYPE: Primary | ICD-10-CM

## 2019-09-26 PROCEDURE — 90471 IMMUNIZATION ADMIN: CPT | Performed by: FAMILY MEDICINE

## 2019-09-26 PROCEDURE — 99203 OFFICE O/P NEW LOW 30 MIN: CPT | Performed by: FAMILY MEDICINE

## 2019-09-26 PROCEDURE — 90686 IIV4 VACC NO PRSV 0.5 ML IM: CPT | Performed by: FAMILY MEDICINE

## 2019-09-26 RX ORDER — VENLAFAXINE HYDROCHLORIDE 150 MG/1
150 TABLET, EXTENDED RELEASE ORAL DAILY
Qty: 90 TABLET | Refills: 1 | Status: SHIPPED | OUTPATIENT
Start: 2019-09-26 | End: 2019-10-30

## 2019-09-26 NOTE — PATIENT INSTRUCTIONS
Thank you for choosing Angela Griffin MD at [place] [address]  To Do: Jose Arthur  1. Please take meds as directed. [facility] is located in [address]. Monday, Tuesday & Friday – 8 a.m. to 4 p.m.   Wednesday, Thursday – 7 a.m. to 3 p.m those potential risks and we strive to make you healthier and to improve your quality of life.     Referrals, and Radiology Information:    If your insurance requires a referral to a specialist, please allow 5 business days to process your referral request. depression may do more than just make you feel low. You may also feel bad physically. Depression can:  · Cause trouble with mental tasks such as remembering, concentrating, or making decisions  · Make you feel nervous and jumpy  · Cause trouble sleeping.  O

## 2019-09-26 NOTE — H&P
[address]    History and Physical    Noemy Tang Patient Status:  No patient class for patient encounter    11/3/1975 MRN [id number]   Location [facility] , [facility] Attending No att.  p tobacco: Never Used    Alcohol use: Yes      Comment: very rarely    Drug use: Never    Allergies/Medications:    Allergies:   Cat Hair Extract        UNKNOWN  Dog Fennel              UNKNOWN    (Not in a hospital admission)    Review of Systems:     Nik 01/29/2019    GLU 88 01/29/2019    CA 9.1 01/29/2019    ALB 3.9 01/29/2019    ALKPHO 71 01/29/2019    BILT 0.4 01/29/2019    TP 7.4 01/29/2019    AST 38 01/29/2019    ALT 64 (H) 01/29/2019               Assessment/Plan:     1.   Depression with anxiety–tomas rand

## 2019-09-27 ENCOUNTER — TELEPHONE (OUTPATIENT)
Dept: FAMILY MEDICINE CLINIC | Facility: CLINIC | Age: 44
End: 2019-09-27

## 2019-09-27 NOTE — TELEPHONE ENCOUNTER
Spoke to pt, informed that Venlafaxine 150mg is on back order at [facility] but they will have it tomorrow. Pt verbalized understanding and agreement and will  prescription at [facility] tomorrow.

## 2019-09-27 NOTE — TELEPHONE ENCOUNTER
Pt was told by Dr. Spencer Mejia that he was going to give him the 150mg of venlafaxine and the pharmacy gave him the 75 mg only.  Please advise

## 2019-09-27 NOTE — TELEPHONE ENCOUNTER
Attempted to reach pt, [facility] requesting a return call to discuss.

## 2019-09-27 NOTE — TELEPHONE ENCOUNTER
Spoke with Mendez Staley at [address] [place] [address], states that Venlafaxine 150mg is on back order and should be arriving at the pharmacy tomorrow. States the last Rx that was dispensed to the patient was Venlafaxine 75mg in April.

## 2019-09-30 ENCOUNTER — TELEPHONE (OUTPATIENT)
Dept: FAMILY MEDICINE CLINIC | Facility: CLINIC | Age: 44
End: 2019-09-30

## 2019-09-30 NOTE — TELEPHONE ENCOUNTER
Verbal given to switch to capsules as they are covered by insurance    Disp Refills Start End     Venlafaxine HCl  MG Oral Tablet 24 Hr 90 tablet 1 9/26/2019     Sig - Route:  Take 1 tablet (150 mg total) by mouth daily. - Oral    Sent to pharmacy as:

## 2019-09-30 NOTE — TELEPHONE ENCOUNTER
Pt states that the pharmacy told him that the aspirin prescribed by the physician is not covered by the insurance. He will need capsules instead.

## 2019-10-01 ENCOUNTER — TELEPHONE (OUTPATIENT)
Dept: FAMILY MEDICINE CLINIC | Facility: CLINIC | Age: 44
End: 2019-10-01

## 2019-10-01 NOTE — TELEPHONE ENCOUNTER
Spoke to pt, pt states that he started the Venlafaxine 150mg yesterday and is experiencing some nausea. He states that the pharmacy told him to take with food and he did but still nauseated today.  Advised pt that nausea and headache is a side effect of med

## 2019-10-01 NOTE — TELEPHONE ENCOUNTER
Patient states he has a new RX for Venlafaxine HCl  MG Oral Tablet 24 Hr capsules. He used to be on this at 75 mg and has some at home. Can he change to the 75's? Please advise.

## 2019-10-29 ENCOUNTER — APPOINTMENT (OUTPATIENT)
Dept: LAB | Age: 44
End: 2019-10-29
Attending: FAMILY MEDICINE
Payer: MEDICAID

## 2019-10-29 ENCOUNTER — OFFICE VISIT (OUTPATIENT)
Dept: FAMILY MEDICINE CLINIC | Facility: CLINIC | Age: 44
End: 2019-10-29
Payer: MEDICAID

## 2019-10-29 VITALS
HEIGHT: 71 IN | WEIGHT: 232 LBS | SYSTOLIC BLOOD PRESSURE: 132 MMHG | TEMPERATURE: 98 F | HEART RATE: 79 BPM | RESPIRATION RATE: 16 BRPM | OXYGEN SATURATION: 98 % | BODY MASS INDEX: 32.48 KG/M2 | DIASTOLIC BLOOD PRESSURE: 88 MMHG

## 2019-10-29 DIAGNOSIS — R39.11 URINARY HESITANCY: ICD-10-CM

## 2019-10-29 DIAGNOSIS — F32.A DEPRESSION, UNSPECIFIED DEPRESSION TYPE: Primary | ICD-10-CM

## 2019-10-29 PROCEDURE — 99214 OFFICE O/P EST MOD 30 MIN: CPT | Performed by: FAMILY MEDICINE

## 2019-10-29 PROCEDURE — 81001 URINALYSIS AUTO W/SCOPE: CPT

## 2019-10-29 PROCEDURE — 87086 URINE CULTURE/COLONY COUNT: CPT

## 2019-10-29 NOTE — PATIENT INSTRUCTIONS
Thank you for choosing Michelle Rice MD at [place] [address]  To Do: Sekou Cedeño  1. Please take meds as directed. [facility] is located in [address]. Monday, Tuesday & Friday – 8 a.m. to 4 p.m.   Wednesday, Thursday – 7 a.m. to 3 p.m those potential risks and we strive to make you healthier and to improve your quality of life.     Referrals, and Radiology Information:    If your insurance requires a referral to a specialist, please allow 5 business days to process your referral request.

## 2019-10-29 NOTE — PROGRESS NOTES
HPI:    Patient ID: Lakisha Hutchins is a 37year old male. HPI  Mr. Cezar Mayo is a pleasant 38-year-old gentleman who I saw previously for depression and is starting him on venlafaxine up to 25 mg daily but did not like the side effects that came from it. and breath sounds normal. No respiratory distress. He has no wheezes. He has no rales. Abdominal: Soft. Bowel sounds are normal. He exhibits no distension. There is no tenderness. Musculoskeletal:         General: No edema.      Lymphadenopathy:     He ha

## 2019-10-30 ENCOUNTER — TELEPHONE (OUTPATIENT)
Dept: FAMILY MEDICINE CLINIC | Facility: CLINIC | Age: 44
End: 2019-10-30

## 2019-10-30 RX ORDER — VENLAFAXINE HYDROCHLORIDE 75 MG/1
75 CAPSULE, EXTENDED RELEASE ORAL DAILY
Qty: 90 CAPSULE | Refills: 0 | Status: SHIPPED | OUTPATIENT
Start: 2019-10-30 | End: 2020-01-28

## 2019-11-04 ENCOUNTER — TELEPHONE (OUTPATIENT)
Dept: FAMILY MEDICINE CLINIC | Facility: CLINIC | Age: 44
End: 2019-11-04

## 2019-11-04 RX ORDER — NITROFURANTOIN MACROCRYSTALS 100 MG/1
100 CAPSULE ORAL 2 TIMES DAILY
Qty: 14 CAPSULE | Refills: 0 | Status: SHIPPED | OUTPATIENT
Start: 2019-11-04 | End: 2020-04-17

## 2019-11-04 NOTE — TELEPHONE ENCOUNTER
Pt states per MD, if he was not feeling better, pt can call office back to let MD know and he can write a script for him. Please discuss further with pt.

## 2019-11-04 NOTE — TELEPHONE ENCOUNTER
Left detailed VM advising patient that a prescription was being sent to the pharmacy for him to take twice a day. Advised to call with questions    No future appointments.     Med sent to [address]

## 2019-11-04 NOTE — TELEPHONE ENCOUNTER
Patient reports that he is having irritation when he urinates, no discharge. No pain when urinating. States he felt like his urine was steaming when urinating. No fevers, States his mate had a bad UTI and is concerned.     Please advise

## 2019-11-14 ENCOUNTER — TELEPHONE (OUTPATIENT)
Dept: FAMILY MEDICINE CLINIC | Facility: CLINIC | Age: 44
End: 2019-11-14

## 2019-11-14 DIAGNOSIS — Z11.3 SCREEN FOR STD (SEXUALLY TRANSMITTED DISEASE): Primary | ICD-10-CM

## 2019-11-14 DIAGNOSIS — R39.11 URINARY HESITANCY: ICD-10-CM

## 2019-11-14 NOTE — TELEPHONE ENCOUNTER
Patient states the doctor gave him antibiotics for a UTI, medication was finished. He is still having symptoms and would like to speak to the doctor or RN. Please advise.

## 2019-11-14 NOTE — TELEPHONE ENCOUNTER
Spoke to pt, informed of MD recommendations. He will come in on Saturday to do the labs. Advised pt to call insurance to find out who is covered for Urology. Pt verbalized understanding and agreement.

## 2019-11-14 NOTE — TELEPHONE ENCOUNTER
+irritation when urinating  +discharge after urinating- white/yellowish color  No pain  No fever/ chills   Patient is concerned would like more testing done.  Possibly STD testing or referral to specialists

## 2019-11-14 NOTE — TELEPHONE ENCOUNTER
Attempted to reach pt, [facility] requesting a return call    STD screening labs ordered  Urinalysis and urine culture ordered    Pt needs to find out which urologist is covered under his insurance

## 2019-11-14 NOTE — TELEPHONE ENCOUNTER
May refer to urology. Please order STD testing. Also would recommend checking urinalysis and urine culture again.

## 2020-01-28 RX ORDER — VENLAFAXINE HYDROCHLORIDE 75 MG/1
CAPSULE, EXTENDED RELEASE ORAL
Qty: 90 CAPSULE | Refills: 1 | Status: SHIPPED | OUTPATIENT
Start: 2020-01-28 | End: 2020-07-23

## 2020-01-28 NOTE — TELEPHONE ENCOUNTER
Requesting VENLAFAXINE HCL ER 75 MG   LOV: 10/29/19  RTC: 6 months  Last Relevant Labs: 11/18/19  Filled: 10/30/19 # 90 with 0 refills    No future appointments.

## 2020-09-26 ENCOUNTER — APPOINTMENT (OUTPATIENT)
Dept: GENERAL RADIOLOGY | Age: 45
End: 2020-09-26
Attending: EMERGENCY MEDICINE
Payer: MEDICAID

## 2020-09-26 ENCOUNTER — HOSPITAL ENCOUNTER (EMERGENCY)
Age: 45
Discharge: HOME | End: 2020-09-26
Attending: EMERGENCY MEDICINE
Payer: MEDICAID

## 2020-09-26 VITALS
SYSTOLIC BLOOD PRESSURE: 128 MMHG | BODY MASS INDEX: 30 KG/M2 | RESPIRATION RATE: 16 BRPM | DIASTOLIC BLOOD PRESSURE: 84 MMHG | OXYGEN SATURATION: 98 % | WEIGHT: 213 LBS | HEART RATE: 61 BPM | TEMPERATURE: 98 F

## 2020-09-26 DIAGNOSIS — S76.311A PULLED HAMSTRING, RIGHT, INITIAL ENCOUNTER: Primary | ICD-10-CM

## 2020-09-26 PROCEDURE — 96374 THER/PROPH/DIAG INJ IV PUSH: CPT

## 2020-09-26 PROCEDURE — 96376 TX/PRO/DX INJ SAME DRUG ADON: CPT

## 2020-09-26 PROCEDURE — 73552 X-RAY EXAM OF FEMUR 2/>: CPT | Performed by: EMERGENCY MEDICINE

## 2020-09-26 PROCEDURE — 99284 EMERGENCY DEPT VISIT MOD MDM: CPT

## 2020-09-26 RX ORDER — HYDROMORPHONE HYDROCHLORIDE 1 MG/ML
1 INJECTION, SOLUTION INTRAMUSCULAR; INTRAVENOUS; SUBCUTANEOUS ONCE
Status: COMPLETED | OUTPATIENT
Start: 2020-09-26 | End: 2020-09-26

## 2020-09-26 RX ORDER — CYCLOBENZAPRINE HCL 10 MG
10 TABLET ORAL 3 TIMES DAILY PRN
Qty: 20 TABLET | Refills: 0 | Status: SHIPPED | OUTPATIENT
Start: 2020-09-26 | End: 2020-10-03

## 2020-09-26 NOTE — ED PROVIDER NOTES
Patient Seen in: THE [facility] Emergency Department In [place]      History   Patient presents with:  Leg or Foot Injury    Stated Complaint: \"right thigh is killing me from running and my leg locked up\" x 2 days ago     HPI    38-year-old -American pain with any extension of the knee and in the posterior thigh area. ED Course   Labs Reviewed - No data to display       Xr Femur Min 2 Views Right (cpt=73552)    Result Date: 9/26/2020  CONCLUSION:  No fracture or dislocation is identified.   Dense mirna

## 2021-10-14 ENCOUNTER — ORDER TRANSCRIPTION (OUTPATIENT)
Dept: ADMINISTRATIVE | Facility: HOSPITAL | Age: 46
End: 2021-10-14

## 2021-10-14 DIAGNOSIS — R93.1 ABNORMAL SCREENING CARDIAC CT: Primary | ICD-10-CM

## 2021-11-16 ENCOUNTER — HOSPITAL ENCOUNTER (EMERGENCY)
Facility: HOSPITAL | Age: 46
Discharge: HOME | End: 2021-11-16
Attending: EMERGENCY MEDICINE
Payer: MEDICAID

## 2021-11-16 VITALS
WEIGHT: 236 LBS | RESPIRATION RATE: 22 BRPM | BODY MASS INDEX: 33.04 KG/M2 | OXYGEN SATURATION: 96 % | SYSTOLIC BLOOD PRESSURE: 159 MMHG | DIASTOLIC BLOOD PRESSURE: 108 MMHG | TEMPERATURE: 98 F | HEART RATE: 62 BPM | HEIGHT: 71 IN

## 2021-11-16 DIAGNOSIS — J01.10 ACUTE FRONTAL SINUSITIS, RECURRENCE NOT SPECIFIED: Primary | ICD-10-CM

## 2021-11-16 PROCEDURE — 99283 EMERGENCY DEPT VISIT LOW MDM: CPT

## 2021-11-16 RX ORDER — ACETAMINOPHEN 500 MG
1000 TABLET ORAL ONCE
Status: COMPLETED | OUTPATIENT
Start: 2021-11-16 | End: 2021-11-16

## 2021-11-16 RX ORDER — IBUPROFEN 600 MG/1
600 TABLET ORAL ONCE
Status: DISCONTINUED | OUTPATIENT
Start: 2021-11-16 | End: 2021-11-16

## 2021-11-16 RX ORDER — AMOXICILLIN AND CLAVULANATE POTASSIUM 875; 125 MG/1; MG/1
1 TABLET, FILM COATED ORAL 2 TIMES DAILY
Qty: 20 TABLET | Refills: 0 | Status: SHIPPED | OUTPATIENT
Start: 2021-11-16 | End: 2021-11-26

## 2021-11-16 RX ORDER — IBUPROFEN 600 MG/1
600 TABLET ORAL ONCE
Status: COMPLETED | OUTPATIENT
Start: 2021-11-16 | End: 2021-11-16

## 2021-11-16 RX ORDER — PREDNISONE 20 MG/1
40 TABLET ORAL DAILY
Qty: 10 TABLET | Refills: 0 | Status: SHIPPED | OUTPATIENT
Start: 2021-11-16 | End: 2021-11-21

## 2021-11-16 RX ORDER — FLUTICASONE PROPIONATE 50 MCG
2 SPRAY, SUSPENSION (ML) NASAL DAILY
Qty: 16 G | Refills: 0 | Status: SHIPPED | OUTPATIENT
Start: 2021-11-16 | End: 2021-12-16

## 2021-11-16 NOTE — ED PROVIDER NOTES
Patient Seen in: [facility] Emergency Department      History   Patient presents with:  Cough/URI    Stated Complaint: sinus infection     Subjective:   HPI    47-year-old male presents reporting pressure to the left frontal and maxillary sinus bernard 32.92 kg/m²         Physical Exam    General:  Vitals as listed. Appears uncomfortable  HEENT: Tenderness on palpation of the left frontal and maxillary sinus. Bilateral TMs pearly gray and without evidence of effusion. clerae anicteric.   Conjunctivae s route daily. , Normal, Disp-16 g, R-0    amoxicillin clavulanate 875-125 MG Oral Tab  Take 1 tablet by mouth 2 (two) times daily for 10 days. , Normal, Disp-20 tablet, R-0

## 2022-09-03 ENCOUNTER — HOSPITAL ENCOUNTER (EMERGENCY)
Facility: HOSPITAL | Age: 47
Discharge: HOME | End: 2022-09-03
Attending: STUDENT IN AN ORGANIZED HEALTH CARE EDUCATION/TRAINING PROGRAM
Payer: MEDICAID

## 2022-09-03 VITALS
SYSTOLIC BLOOD PRESSURE: 154 MMHG | RESPIRATION RATE: 20 BRPM | TEMPERATURE: 98 F | BODY MASS INDEX: 33 KG/M2 | HEART RATE: 64 BPM | OXYGEN SATURATION: 98 % | DIASTOLIC BLOOD PRESSURE: 102 MMHG | WEIGHT: 236 LBS

## 2022-09-03 DIAGNOSIS — L98.9 SKIN LESION: Primary | ICD-10-CM

## 2022-09-03 LAB
BILIRUB UR QL STRIP.AUTO: NEGATIVE
CLARITY UR REFRACT.AUTO: CLEAR
COLOR UR AUTO: YELLOW
GLUCOSE UR STRIP.AUTO-MCNC: NEGATIVE MG/DL
KETONES UR STRIP.AUTO-MCNC: NEGATIVE MG/DL
LEUKOCYTE ESTERASE UR QL STRIP.AUTO: NEGATIVE
NITRITE UR QL STRIP.AUTO: NEGATIVE
PH UR STRIP.AUTO: 7 [PH] (ref 5–8)
PROT UR STRIP.AUTO-MCNC: NEGATIVE MG/DL
RBC UR QL AUTO: NEGATIVE
SP GR UR STRIP.AUTO: >=1.03 (ref 1–1.03)
UROBILINOGEN UR STRIP.AUTO-MCNC: 0.2 MG/DL

## 2022-09-03 PROCEDURE — 87591 N.GONORRHOEAE DNA AMP PROB: CPT | Performed by: STUDENT IN AN ORGANIZED HEALTH CARE EDUCATION/TRAINING PROGRAM

## 2022-09-03 PROCEDURE — 81003 URINALYSIS AUTO W/O SCOPE: CPT | Performed by: STUDENT IN AN ORGANIZED HEALTH CARE EDUCATION/TRAINING PROGRAM

## 2022-09-03 PROCEDURE — 87529 HSV DNA AMP PROBE: CPT | Performed by: STUDENT IN AN ORGANIZED HEALTH CARE EDUCATION/TRAINING PROGRAM

## 2022-09-03 PROCEDURE — 87491 CHLMYD TRACH DNA AMP PROBE: CPT | Performed by: STUDENT IN AN ORGANIZED HEALTH CARE EDUCATION/TRAINING PROGRAM

## 2022-09-03 PROCEDURE — 87593 ORTHOPOXVIRUS AMP PRB EACH: CPT | Performed by: STUDENT IN AN ORGANIZED HEALTH CARE EDUCATION/TRAINING PROGRAM

## 2022-09-03 PROCEDURE — 99283 EMERGENCY DEPT VISIT LOW MDM: CPT

## 2022-09-03 RX ORDER — VALACYCLOVIR HYDROCHLORIDE 1 G/1
1000 TABLET, FILM COATED ORAL EVERY 12 HOURS
Qty: 20 TABLET | Refills: 0 | Status: SHIPPED | OUTPATIENT
Start: 2022-09-03 | End: 2022-09-13

## 2022-09-06 LAB
C TRACH DNA SPEC QL NAA+PROBE: NEGATIVE
HSV1 DNA SPEC QL NAA+PROBE: NEGATIVE
HSV2 DNA SPEC QL NAA+PROBE: POSITIVE
N GONORRHOEA DNA SPEC QL NAA+PROBE: NEGATIVE

## 2022-09-08 LAB
MONKEYPOX PCR: NOT DETECTED
MONKEYPOX PCR: NOT DETECTED

## 2023-03-29 ENCOUNTER — HOSPITAL ENCOUNTER (OUTPATIENT)
Facility: HOSPITAL | Age: 48
Setting detail: HOSPITAL OUTPATIENT SURGERY
Discharge: HOME | End: 2023-03-29
Attending: INTERNAL MEDICINE | Admitting: INTERNAL MEDICINE
Payer: MEDICAID

## 2023-03-29 VITALS
TEMPERATURE: 98 F | DIASTOLIC BLOOD PRESSURE: 106 MMHG | WEIGHT: 236 LBS | BODY MASS INDEX: 33.04 KG/M2 | OXYGEN SATURATION: 96 % | HEART RATE: 80 BPM | HEIGHT: 71 IN | RESPIRATION RATE: 20 BRPM | SYSTOLIC BLOOD PRESSURE: 142 MMHG

## 2023-03-29 PROCEDURE — 88305 TISSUE EXAM BY PATHOLOGIST: CPT | Performed by: INTERNAL MEDICINE

## 2023-03-29 PROCEDURE — 0DBK8ZX EXCISION OF ASCENDING COLON, VIA NATURAL OR ARTIFICIAL OPENING ENDOSCOPIC, DIAGNOSTIC: ICD-10-PCS | Performed by: INTERNAL MEDICINE

## 2023-03-29 PROCEDURE — 99152 MOD SED SAME PHYS/QHP 5/>YRS: CPT | Performed by: INTERNAL MEDICINE

## 2023-03-29 RX ORDER — SODIUM CHLORIDE, SODIUM LACTATE, POTASSIUM CHLORIDE, CALCIUM CHLORIDE 600; 310; 30; 20 MG/100ML; MG/100ML; MG/100ML; MG/100ML
INJECTION, SOLUTION INTRAVENOUS CONTINUOUS
Status: DISCONTINUED | OUTPATIENT
Start: 2023-03-29 | End: 2023-03-29

## 2023-03-29 RX ORDER — MIDAZOLAM HYDROCHLORIDE 1 MG/ML
INJECTION INTRAMUSCULAR; INTRAVENOUS
Status: DISCONTINUED | OUTPATIENT
Start: 2023-03-29 | End: 2023-03-29

## 2023-03-29 NOTE — DISCHARGE INSTRUCTIONS
[facility], [place]    Procedure(s): Colonoscopy    Findings:    1. Colon polyp x 1. Small, benign. Removed. I will call with results in 1-2 days    Disposition:  home    Patient Instructions:     1. Consume a high fiber diet. 2.  Repeat colonoscopy in 7 years. Eliza Taylor MD  Home Care Instructions for Colonoscopy with Sedation    Diet:  - Resume your regular diet   - Start with light meals to minimize bloating.  - Do not drink alcohol today. Medication:  - If you have questions about resuming your normal medications, please contact your Primary Care Physician. Activities:  - Take it easy today. Do not return to work today. - Do not drive today. - Do not operate any machinery today (including kitchen equipment). Colonoscopy:  - You may notice some rectal \"spotting\" (a little blood on the toilet tissue) for a day or two after the exam. This is normal.  - If you experience any rectal bleeding (not spotting), persistent tenderness or sharp severe abdominal pains, oral temperature over 100 degrees Fahrenheit, light-headedness or dizziness, or any other problems, contact your doctor. **If unable to reach your doctor, please go to the [facility] Emergency Room**    - Your referring physician will receive a full report of your examination.  - If you do not hear from your doctor's office within two weeks of your biopsy, please call them for your results.

## 2023-03-29 NOTE — OPERATIVE REPORT
[facility] Patient Status:  [facility] Outpatient Surgery    11/3/1975 MRN [id number]   Location [address] [place] [address] Attending Ted Alfredo MD   Hosp Day # 0 PCP Carole Mazariegos MD         PATIENT NAME: Dinora Connell OF OPERATION: 3/29/2023    PREOPERATIVE DIAGNOSIS:  1. CRC screening  POSTOPERATIVE DIAGNOSIS:  1. Colon polyp, removed    PROCEDURE PERFORMED: Colonoscopy with conscious sedation  SURGEON: Edy Lockett MD   MEDICATIONS: Fentanyl 100 mcg IV and Versed 4 mg IV in divided doses under the supervision of Dr. Eduard Lockett. PROCEDURE AND FINDINGS: The patient was placed into the left lateral decubitus position after informed consent was obtained. All questions were answered. An ASA score was assigned, Mallampati score 2. IV sedation was administered. A rectal exam was performed which was normal.  The Olympus video colonoscope was then introduced through the rectum and advanced through the colon to the cecum. The quality of prep was excellent, adequate, Aronchick 1. The cecum was identified by the ileocecal valve and appendiceal orifice. The colonoscope was then withdrawn and the mucosa was further carefully inspected. There was a 7 mm polyp in the ascending colon that was completely removed with a hot snare and retrieved. The remainder of the entire examined colon was otherwise normal.  After retroflexion in the rectum, the colonoscope was straightened and removed and the procedure was completed. The patient tolerated the procedure well. There were no implants placed nor significant blood loss. There were no immediate apparent complications. Total moderate sedation time was 19 minutes. A trained sedation nurse was present to assist in monitoring the patient during the entire length of the moderate sedation time. RECOMMENDATIONS    Follow up on pathology. Repeat colonoscopy in 7 years if the polyp(s) is/are adenomatous.    Consume a high fiber diet    Booker Angela MD

## (undated) DEVICE — 3M™ RED DOT™ MONITORING ELECTRODE WITH FOAM TAPE AND STICKY GEL, 50/BAG, 20/CASE, 72/PLT 2570: Brand: RED DOT™

## (undated) DEVICE — 1200CC GUARDIAN II: Brand: GUARDIAN

## (undated) DEVICE — TRAP SPEC REMOVAL ETRAP 15CM

## (undated) DEVICE — SNARE 9MM 230CM 2.4MM EXACTO

## (undated) DEVICE — Device: Brand: DEFENDO AIR/WATER/SUCTION AND BIOPSY VALVE

## (undated) DEVICE — ENDOSCOPY PACK - LOWER: Brand: MEDLINE INDUSTRIES, INC.

## (undated) DEVICE — FILTERLINE NASAL ADULT O2/CO2

## (undated) NOTE — ED AVS SNAPSHOT
Romero Villarreal   MRN: [id number]    Department:  THE [facility] Emergency Department in [facility]   Date of Visit:  11/11/2018           Disclosure     Insurance plans vary and the physician(s) referred by the ER may not be covered by your plan.  Please conta tell this physician (or your personal doctor if your instructions are to return to your personal doctor) about any new or lasting problems. The primary care or specialist physician will see patients referred from the [facility] Emergency Department.  Kyler Davila

## (undated) NOTE — ED AVS SNAPSHOT
Danya Lara   MRN: [id number]    Department:  [facility] Emergency Department   Date of Visit:  7/7/2018           Disclosure     Insurance plans vary and the physician(s) referred by the ER may not be covered by your plan.  Please contact your tell this physician (or your personal doctor if your instructions are to return to your personal doctor) about any new or lasting problems. The primary care or specialist physician will see patients referred from the [facility] Emergency Department.  Yanci Ha

## (undated) NOTE — ED AVS SNAPSHOT
Lakisha Hutchins   MRN: [id number]    Department:  [facility] Emergency Department   Date of Visit:  2/1/2019           Disclosure     Insurance plans vary and the physician(s) referred by the ER may not be covered by your plan.  Please contact your tell this physician (or your personal doctor if your instructions are to return to your personal doctor) about any new or lasting problems. The primary care or specialist physician will see patients referred from the [facility] Emergency Department.  Jarred Brannon

## (undated) NOTE — ED AVS SNAPSHOT
Karri Mccoy   MRN: [id number]    Department:  [facility] Emergency Department   Date of Visit:  5/8/2019           Disclosure     Insurance plans vary and the physician(s) referred by the ER may not be covered by your plan.  Please contact your tell this physician (or your personal doctor if your instructions are to return to your personal doctor) about any new or lasting problems. The primary care or specialist physician will see patients referred from the [facility] Emergency Department.  Singh Monroy

## (undated) NOTE — ED AVS SNAPSHOT
[facility] Emergency Department    [place]  [address] [address]    Phone:  [phone]    Fax:  [address]   MRN: [id number]    Department:  [facility] Emergency Department   Date of Visit:  5/ IF THERE IS ANY CHANGE OR WORSENING OF YOUR CONDITION, CALL YOUR PRIMARY CARE PHYSICIAN AT ONCE OR RETURN IMMEDIATELY TO THE EMERGENCY DEPARTMENT.     If you have been prescribed any medication(s), please fill your prescription right away and begin taking t

## (undated) NOTE — ED AVS SNAPSHOT
Jose Arthur   MRN: [id number]    Department:  THE [facility] Emergency Department in [place]   Date of Visit:  6/24/2018           Disclosure     Insurance plans vary and the physician(s) referred by the ER may not be covered by your plan.  Please contac tell this physician (or your personal doctor if your instructions are to return to your personal doctor) about any new or lasting problems. The primary care or specialist physician will see patients referred from the [facility] Emergency Department.  Hernandez Rodriguez

## (undated) NOTE — ED AVS SNAPSHOT
Romero Villarreal   MRN: [id number]    Department:  [facility] Emergency Department   Date of Visit:  1/29/2019           Disclosure     Insurance plans vary and the physician(s) referred by the ER may not be covered by your plan.  Please contact you tell this physician (or your personal doctor if your instructions are to return to your personal doctor) about any new or lasting problems. The primary care or specialist physician will see patients referred from the [facility] Emergency Department.  Yanci Ha

## (undated) NOTE — ED AVS SNAPSHOT
Swathi Martinez   MRN: [id number]    Department:  [facility] Emergency Department   Date of Visit:  1/24/2019           Disclosure     Insurance plans vary and the physician(s) referred by the ER may not be covered by your plan.  Please contact you tell this physician (or your personal doctor if your instructions are to return to your personal doctor) about any new or lasting problems. The primary care or specialist physician will see patients referred from the [facility] Emergency Department.  Jayne Millan

## (undated) NOTE — ED AVS SNAPSHOT
[facility] Emergency Department    [facility]  [address] [address]    Phone:  [phone]    Fax:  [address]   MRN: [id number]    Department:  [facility] Emergency Department   Date of Visit:  [phone]/ [phone]       To Check ER Wait Times:  TEXT 'ERwait' to 68107      Click [email or web address]      Or call [phone]    If you have any problems with your follow-up, please call our  at [phone]    Juan vega problema con I have read and understand the instructions given to me by my caregivers. [facility]        Pharmacy Address Phone Number   [phone] ([address]) Trung Chambers visit,  view other health information, and more. To sign up or find more information, go to [email or web address] and click on the Sign Up Now link in the [employer] box.      Enter your nooked Activation Code exactly as it appears below along with yo

## (undated) NOTE — ED AVS SNAPSHOT
Liana Rachel   MRN: [id number]    Department:  THE [facility] Emergency Department in [place]   Date of Visit:  10/6/2017           Disclosure     Insurance plans vary and the physician(s) referred by the ER may not be covered by your plan.  Please cont If you have been prescribed any medication(s), please fill your prescription right away and begin taking the medication(s) as directed    If the emergency physician has read X-rays, these will be re-interpreted by a radiologist.  If there is a significant